# Patient Record
Sex: MALE | Race: WHITE | NOT HISPANIC OR LATINO | URBAN - METROPOLITAN AREA
[De-identification: names, ages, dates, MRNs, and addresses within clinical notes are randomized per-mention and may not be internally consistent; named-entity substitution may affect disease eponyms.]

---
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
## 2022-08-31 NOTE — ASU PREOP CHECKLIST, PEDIATRIC - INTERNAL PROSTHESES
Please contact your Oncologist if you have any questions regarding the udenyca that you received today. You are instructed to call the office or go to the Emergency Dept. If you experience any of the following symptoms:    Dizziness/lightheadedness   Acute nausea or vomiting-not relieved by medications  Headaches-not relieved by medications  New chest pain or pressure  New rash /itching  New shortness of breath  Fever,chills or signs or symptoms of infection    Make sure you are drinking 48 to 64 ounces of water daily-if you are unable to drink fluids let us know right away. no

## 2023-05-25 NOTE — PATIENT PROFILE PEDIATRIC. - NS CRAFFT PART A3 ALCOHOL
Caller: KRYSTA SHARPE    Relationship to patient: SELF    Best call back number: [phone]    Chief complaint: RIGHT KNEE PAIN    Type of visit: CORTISONE INJECTION    Requested date: ASAP    If rescheduling, when is the original appointment: N/A     Additional notes: LAST SEEN JUNE 2022 MANOHAR LEMUS   No

## 2023-09-20 NOTE — PACU DISCHARGE NOTE - AIRWAY PATENCY:
Satisfactory Debridement Text: The wound edges were debrided prior to proceeding with the closure to facilitate wound healing.

## 2023-11-08 ENCOUNTER — OUTPATIENT (OUTPATIENT)
Dept: OUTPATIENT SERVICES | Facility: HOSPITAL | Age: 23
LOS: 1 days | End: 2023-11-08

## 2023-11-08 ENCOUNTER — APPOINTMENT (OUTPATIENT)
Dept: ULTRASOUND IMAGING | Facility: CLINIC | Age: 23
End: 2023-11-08
Payer: COMMERCIAL

## 2023-11-08 PROCEDURE — 76536 US EXAM OF HEAD AND NECK: CPT | Mod: 26

## 2023-11-14 ENCOUNTER — TRANSCRIPTION ENCOUNTER (OUTPATIENT)
Age: 23
End: 2023-11-14

## 2025-04-10 NOTE — ED ADULT TRIAGE NOTE - NS ED NURSE DIRECT TO ROOM YN
-- DO NOT REPLY / DO NOT REPLY ALL --  -- This inbox is not monitored. If this was sent to the wrong provider or department, reroute message to P ECO Reroute pool. --  -- Message is from Engagement Center Operations (ECO) --    General Patient Message: Patient is calling to check on the status of the forms he dropped off to be completed last Thursday. Please call back with an update.    Caller Information       Contact Date/Time Type Contact Phone/Fax    04/08/2025 03:57 PM CDT Phone (Incoming) Latonya [phone]            Alternative phone number: no    Can a detailed message be left? Yes - Voicemail   Patient has been advised the message will be addressed within 2-3 business days.              Copied from CRM #[id number]. Topic: MW Messaging - MW Patient Request  >> Apr 8, 2025  3:59 PM Rachel STRICKLAND wrote:  Latonya called requesting to send a general message to clinician.   Verified issue is NOT regarding a symptom(s) requiring routine or emergent triage. Verified another message template type and CRM does not apply.    Selected 'Wrap Up CRM' and created new Telephone Encounter after clicking 'Convert to Clinical Call'. Selected appropriate Reason for Call.  Sent Pt message template and routed as routine priority per Clinician KB page to appropriate clinician pool. Readback full message.  
Patient returning call regarding forms, please follow up with him, Thank you
Please help patient schedule to get forms completed, he needs to bring forms with him at time of appointment. Thank you.
Thank you, we will call him as well. :)
No